# Patient Record
Sex: MALE | Race: BLACK OR AFRICAN AMERICAN | Employment: FULL TIME | URBAN - METROPOLITAN AREA
[De-identification: names, ages, dates, MRNs, and addresses within clinical notes are randomized per-mention and may not be internally consistent; named-entity substitution may affect disease eponyms.]

---

## 2019-01-26 ENCOUNTER — HOSPITAL ENCOUNTER (EMERGENCY)
Age: 54
Discharge: HOME | End: 2019-01-27
Attending: EMERGENCY MEDICINE | Admitting: EMERGENCY MEDICINE
Payer: SELF-PAY

## 2019-01-26 VITALS
RESPIRATION RATE: 18 BRPM | TEMPERATURE: 98 F | SYSTOLIC BLOOD PRESSURE: 165 MMHG | DIASTOLIC BLOOD PRESSURE: 98 MMHG | OXYGEN SATURATION: 98 % | HEART RATE: 88 BPM

## 2019-01-26 DIAGNOSIS — S01.112A LACERATION OF LEFT EYEBROW, INITIAL ENCOUNTER: Primary | ICD-10-CM

## 2019-01-26 PROCEDURE — 75810000293 HC SIMP/SUPERF WND  RPR

## 2019-01-26 PROCEDURE — 77030031132 HC SUT NYL COVD -A

## 2019-01-26 PROCEDURE — 99282 EMERGENCY DEPT VISIT SF MDM: CPT

## 2019-01-27 PROCEDURE — 75810000293 HC SIMP/SUPERF WND  RPR

## 2019-01-27 PROCEDURE — 74011250636 HC RX REV CODE- 250/636: Performed by: PHYSICIAN ASSISTANT

## 2019-01-27 PROCEDURE — 90471 IMMUNIZATION ADMIN: CPT

## 2019-01-27 PROCEDURE — 90715 TDAP VACCINE 7 YRS/> IM: CPT | Performed by: PHYSICIAN ASSISTANT

## 2019-01-27 RX ADMIN — TETANUS TOXOID, REDUCED DIPHTHERIA TOXOID AND ACELLULAR PERTUSSIS VACCINE, ADSORBED 0.5 ML: 5; 2.5; 8; 8; 2.5 SUSPENSION INTRAMUSCULAR at 00:46

## 2019-01-27 NOTE — ED PROVIDER NOTES
EMERGENCY DEPARTMENT HISTORY AND PHYSICAL EXAM 
 
Date: 1/26/2019 Patient Name: Jeanine Giles History of Presenting Illness Chief Complaint Patient presents with  
 Head Injury  Laceration History Provided By: Patient Chief Complaint: head laceration Duration: 8 Hours Timing:  Acute Location: left eyebrow Quality: Aching Severity: Mild Modifying Factors: none Associated Symptoms: denies any other associated signs or symptoms HPI: Jeanine Giles is a 52 y.o. male with a PMH of No significant past medical history who presents to the ER c/o left eyebrow laceration after having a mechanical fall earlier this afternoon. Patient states his hand rail gave way, and he fell hitting his head on the opposite hand rail. He is unsure of his last tetanus shot. He is not on any blood thinners. He did not have any LOC from the fall. He tried cleaning the wound with neosporin. No other symptoms or complaints. PCP: None Past History Past Medical History: 
History reviewed. No pertinent past medical history. Past Surgical History: 
History reviewed. No pertinent surgical history. Family History: 
History reviewed. No pertinent family history. Social History: 
Social History Tobacco Use  Smoking status: Never Smoker  Smokeless tobacco: Never Used Substance Use Topics  Alcohol use: No  
  Frequency: Never  Drug use: No  
 
 
Allergies: Allergies Allergen Reactions  Dilantin [Phenytoin Sodium Extended] Unknown (comments) Review of Systems Review of Systems Constitutional: Negative for chills, fatigue and fever. HENT: Negative. Negative for sore throat. Eyes: Negative. Respiratory: Negative for cough and shortness of breath. Cardiovascular: Negative for chest pain and palpitations. Gastrointestinal: Negative for abdominal pain, nausea and vomiting. Genitourinary: Negative for dysuria. Musculoskeletal: Negative. Skin: Positive for wound. Left eyebrow laceration Neurological: Negative for dizziness, weakness, light-headedness and headaches. Psychiatric/Behavioral: Negative. All other systems reviewed and are negative. Physical Exam  
 
Vitals:  
 01/26/19 2127 BP: (!) 165/98 Pulse: 88 Resp: 18 Temp: 98 °F (36.7 °C) SpO2: 98% Physical Exam  
Constitutional: He is oriented to person, place, and time. He appears well-developed and well-nourished. No distress. HENT:  
Head: Normocephalic. Head is with laceration. Head is without raccoon's eyes and without Kaur's sign. Right Ear: External ear and ear canal normal.  
Left Ear: External ear and ear canal normal.  
Nose: Nose normal.  
Mouth/Throat: Oropharynx is clear and moist.  
Eyes: Conjunctivae are normal. Pupils are equal, round, and reactive to light. No scleral icterus. Neck: Normal range of motion. Neck supple. No JVD present. No spinous process tenderness present. No tracheal deviation present. Cardiovascular: Normal rate, regular rhythm and normal heart sounds. No murmur heard. Pulmonary/Chest: Effort normal and breath sounds normal. No respiratory distress. He has no wheezes. He has no rales. He exhibits no tenderness. Abdominal: Soft. There is no tenderness. Musculoskeletal: Normal range of motion. Neurological: He is alert and oriented to person, place, and time. He has normal strength. Gait normal. GCS eye subscore is 4. GCS verbal subscore is 5. GCS motor subscore is 6. Skin: Skin is warm and dry. He is not diaphoretic. Psychiatric: He has a normal mood and affect. Nursing note and vitals reviewed. Diagnostic Study Results Labs - No results found for this or any previous visit (from the past 12 hour(s)). Radiologic Studies - No orders to display CT Results  (Last 48 hours) None CXR Results  (Last 48 hours) None Medical Decision Making I am the first provider for this patient. I reviewed the vital signs, available nursing notes, past medical history, past surgical history, family history and social history. Vital Signs-Reviewed the patient's vital signs. Records Reviewed: Nursing Notes and Old Medical Records 1:26 AM 
Pt is a 53 y/o female who presents to the ER c/o fall, head laceration. Tried cleaning the wound and dressing with neosporin, however reports continued bleeding. Tetanus updated. Pt NVI in tact and answering all questions appropriately. No clinical indication for further testing or imaging at this time. Wound cleansed and closed with sutures. Discussed wound care, given return precautions. All questions answered and patient in agreement with plan of care. Will plan for discharge. Gilmar Christie PA-C Disposition: 
Discharged DISCHARGE NOTE:  
 
  Care plan outlined and precautions discussed. Patient has no new complaints, changes, or physical findings. Results of n/a were reviewed with the patient. All medications were reviewed with the patient; will d/c home with n/a. All of pt's questions and concerns were addressed. Patient was instructed and agrees to follow up with PCP or ER, as well as to return to the ED upon further deterioration. Patient is ready to go home. Follow-up Information Follow up With Specialties Details Why Contact Info [facility] EMERGENCY DEPT Emergency Medicine  If symptoms worsen [address] 
[phone] [facility] EMERGENCY DEPT Emergency Medicine In 1 week For suture removal [address] 
[phone] There are no discharge medications for this patient. Provider Notes (Medical Decision Making):  
 
Procedures: 
Wound Repair 
Date/Time: 1/27/2019 12:25 AM 
Performed by: PAPreparation: skin prepped with Shur-Clens Pre-procedure re-eval: Immediately prior to the procedure, the patient was reevaluated and found suitable for the planned procedure and any planned medications. Time out: Immediately prior to the procedure a time out was called to verify the correct patient, procedure, equipment, staff and marking as appropriate. Cleo Mil[place] Location details: right eyebrow Wound length:2.6 - 7.5 cm (3 cm) Anesthesia: local infiltration Anesthesia: 
Local Anesthetic: lidocaine 1% without epinephrine Anesthetic total: 5 mL Foreign bodies: no foreign bodies Debridement: none Skin closure: 5-0 nylon Number of sutures: 5 Technique: simple and interrupted Approximation: close Dressing: bandaids. Patient tolerance: Patient tolerated the procedure well with no immediate complications My total time at bedside, performing this procedure was 16-30 minutes (20). Diagnosis Clinical Impression: 1. Laceration of left eyebrow, initial encounter

## 2019-01-27 NOTE — ED TRIAGE NOTES
Patient comes in stating that he was walking down the stairs holding on to the rail when it gave out and he hit his head on the other rail, Has a lacerations above left eye, denies passing out and is not on any blood thinners.

## 2019-01-27 NOTE — DISCHARGE INSTRUCTIONS

## 2019-01-27 NOTE — ED NOTES
12:54 AM 
01/27/19 Discharge instructions given to patient (name) with verbalization of understanding. Patient accompanied by self. Patient discharged with the following prescriptions none. Patient discharged to home (destination).   
  
Fabienne Pineda RN

## 2019-02-05 ENCOUNTER — HOSPITAL ENCOUNTER (EMERGENCY)
Age: 54
Discharge: HOME | End: 2019-02-05
Attending: EMERGENCY MEDICINE
Payer: SELF-PAY

## 2019-02-05 VITALS
TEMPERATURE: 97.7 F | OXYGEN SATURATION: 97 % | RESPIRATION RATE: 15 BRPM | SYSTOLIC BLOOD PRESSURE: 132 MMHG | WEIGHT: 180 LBS | HEART RATE: 61 BPM | DIASTOLIC BLOOD PRESSURE: 93 MMHG

## 2019-02-05 DIAGNOSIS — Z48.02 VISIT FOR SUTURE REMOVAL: Primary | ICD-10-CM

## 2019-02-05 PROCEDURE — 75810000275 HC EMERGENCY DEPT VISIT NO LEVEL OF CARE

## 2019-02-05 NOTE — DISCHARGE INSTRUCTIONS
Patient Education   Return if worsening pain, redness, warmth, discharge. You may wash the laceration site, but do no scrub hard for a few weeks. Learning About Stitches and Staples Removal  When are stitches and staples removed? Your doctor will tell you when to have your stitches or staples removed, usually in 7 to 14 days. How long you'll be told to wait will depend on things like where the wound is located, how big and how deep the wound is, and what your general health is like. Do not remove the stitches on your own. Stitches on the face are usually removed within a week. But stitches and staples on other areas of the body, such as on the back or belly or over a joint, may need to stay in place longer, often a week or two. Be sure to follow your doctor's instructions. How are stitches and staples removed? It usually doesn't hurt when the doctor removes the stitches or staples. You may feel a tug as each stitch or staple is removed. · You will either be seated or lying down. · To remove stitches, the doctor will use scissors to cut each of the knots and then pull the threads out. · To remove staples, the doctor will use a tool to take out the staples one at a time. · The area may still feel tender after the stitches or staples are gone. But it should feel better within a few minutes or up to a few hours. What can you expect after stitches and staples are removed? Depending on the type and location of the cut, you will have a scar. Scars usually fade over time. Keep the area clean, but you won't need a bandage. When should you call for help? Call your doctor now or seek immediate medical care if :  · You have new pain, or your pain gets worse. · You have trouble moving the area near the scar. · You have symptoms of infection, such as:  ? Increased pain, swelling, warmth, or redness around the scar. ? Red streaks leading from the scar. ? Pus draining from the scar. ? A fever.   Watch closely for changes in your health, and be sure to contact your doctor if:  · The scar opens. · You do not get better as expected. Follow-up care is a key part of your treatment and safety. Be sure to make and go to all appointments, and call your doctor if you do not get better as expected. It's also a good idea to keep a list of the medicines you take. Where can you learn more? Go to http://tea-lawrence.info/. Enter H839 in the search box to learn more about \"Learning About Stitches and Staples Removal.\"  Current as of: September 23, 2018  Content Version: 11.9  © 2293-0027 [employer]. Care instructions adapted under license by [employer] (which disclaims liability or warranty for this information). If you have questions about a medical condition or this instruction, always ask your healthcare professional. [address] any warranty or liability for your use of this information.

## 2019-02-05 NOTE — ED PROVIDER NOTES
EMERGENCY DEPARTMENT HISTORY AND PHYSICAL EXAM 
 
1:49 PM 
 
 
Date: 2/5/2019 Patient Name: Brandt Chakraborty History of Presenting Illness Chief Complaint Patient presents with  Suture Removal  
 
 
 
History Provided By: Patient Chief Complaint: suture removal 
Duration: 10 Days Timing:  Improving Location: L eye brow Quality: not reported Severity: N/A Modifying Factors: none Associated Symptoms: denies any other associated signs or symptoms Additional History (Context): Brandt Chakraborty is a 52 y.o. male with No significant past medical history who presents for suture removal. The pt had 5 sutures placed 1/27/2019 to her left eye brow. The wound has been healing and he has had no complications. Denies fever, chills, redness, drainage, swelling to the site, or any other associated sx. No other complaints or concerns. PCP: None Past History Past Medical History: 
History reviewed. No pertinent past medical history. Past Surgical History: 
History reviewed. No pertinent surgical history. Family History: 
History reviewed. No pertinent family history. Social History: 
Social History Tobacco Use  Smoking status: Never Smoker  Smokeless tobacco: Never Used Substance Use Topics  Alcohol use: No  
  Frequency: Never  Drug use: No  
 
 
Allergies: Allergies Allergen Reactions  Dilantin [Phenytoin Sodium Extended] Unknown (comments) Review of Systems Review of Systems Constitutional: Negative for chills and fever. Respiratory: Negative for shortness of breath. Cardiovascular: Negative for chest pain. Gastrointestinal: Negative for nausea and vomiting. Skin: Positive for wound (improved). Negative for color change. Neurological: Negative for weakness and numbness. All other systems reviewed and are negative. Physical Exam  
 
Visit Vitals BP (!) 132/93 (BP 1 Location: Right arm, BP Patient Position: At rest) Pulse 61 Temp 97.7 °F (36.5 °C) Resp 15 Wt 81.6 kg (180 lb) SpO2 97% Physical Exam  
Constitutional: He is oriented to person, place, and time. He appears well-developed and well-nourished. HENT:  
Head: Normocephalic. Head is with laceration. Right Ear: External ear normal.  
Left Ear: External ear normal.  
Nose: Nose normal.  
Mouth/Throat: Oropharynx is clear and moist and mucous membranes are normal.  
Eyes: Conjunctivae and EOM are normal. Pupils are equal, round, and reactive to light. Neck: Normal range of motion. Cardiovascular: Normal rate, regular rhythm and normal heart sounds. Pulmonary/Chest: Effort normal and breath sounds normal.  
Musculoskeletal: Normal range of motion. Neurological: He is alert and oriented to person, place, and time. Skin: Skin is warm and dry. Psychiatric: He has a normal mood and affect. His behavior is normal. Judgment and thought content normal.  
Nursing note and vitals reviewed. Diagnostic Study Results Labs - No results found for this or any previous visit (from the past 12 hour(s)). Radiologic Studies - No orders to display Medical Decision Making I am the first provider for this patient. I reviewed the vital signs, available nursing notes, past medical history, past surgical history, family history and social history. Vital Signs-Reviewed the patient's vital signs. Records Reviewed: Nursing Notes and Old Medical Records (Time of Review: 1:49 PM) Provider Notes (Medical Decision Making):  
 
Procedures: 
Suture/Staple Removal 
Date/Time: 2/5/2019 1:52 PM 
Performed by: Elvis Parra PA-C Authorized by: Aylin Townsend MD  
 
Consent:  
  Consent obtained:  Verbal 
  Consent given by:  Patient Risks discussed:  Bleeding, pain and wound separation Alternatives discussed:  No treatment and delayed treatment Universal protocol: Procedure explained and questions answered to patient or proxy's satisfaction: yes Relevant documents present and verified: yes Required blood products, implants, devices, and special equipment available: no   
  Site/side marked: yes Immediately prior to procedure, a time out was called: yes Location: Location:  Head/neck Head/neck location:  Eyebrow Eyebrow location:  L eyebrow Procedure details:  
  Wound appearance:  No signs of infection, good wound healing and clean Number of sutures removed:  5 Post-procedure details: Post-removal:  No dressing applied Patient tolerance of procedure: Tolerated well, no immediate complications Based upon the patient's presentation with noted HPI and PE,, I believe that the patient is having a well healing laceration with prior suture repair, and now with removal. 
 
Plan/discharge instructions Return if worsening pain, redness, warmth, discharge. You may wash the laceration site, but do no scrub hard for a few weeks. Pt results have been reviewed with the patient and any family present. They have been counseled regarding diagnosis, treatment, and plan. They verbally convey understanding and agreement of the signs, symptoms, diagnosis, treatment and prognosis and additionally agrees to follow up as discussed. They also agree with the care-plan and convey that all of their questions have been answered. I have also provided discharge instructions for them that include: educational information regarding their diagnosis and treatment, and list of reasons why they would want to return to the ED prior to their follow-up appointment, should their condition change. Thai Casey PA-C Diagnosis Clinical Impression: 1. Visit for suture removal   
 
Disposition: home Follow-up Information Follow up With Specialties Details Why Contact [facility] EMERGENCY DEPT Emergency Medicine  As needed, If symptoms worsen [address] [facility] [address] 
[phone] [address] in 2 days  [place] [address] 
[address] ([facility]) [address] [place] [address] Medication List  
  
You have not been prescribed any medications. _______________________________ Attestations: 
Scribe Attestation Bessy Mcadams acting as a scribe for and in the presence [employer], [place] February 05, 2019 at 1:49 PM 
    
Provider Attestation:     
I personally performed the services described in the documentation, reviewed the documentation, as recorded by the scribe in my presence, and it accurately and completely records my words and actions. February 05, 2019 at 1:49 PM - Ghanshyam Herbert PA-C 
_______________________________

## 2021-09-16 ENCOUNTER — HOSPITAL ENCOUNTER (OUTPATIENT)
Dept: LAB | Age: 56
Discharge: HOME | End: 2021-09-16

## 2021-09-16 LAB — SENTARA SPECIMEN COL,SENBCF: NORMAL

## 2021-09-16 PROCEDURE — 99001 SPECIMEN HANDLING PT-LAB: CPT

## 2022-03-11 ENCOUNTER — HOSPITAL ENCOUNTER (OUTPATIENT)
Dept: LAB | Age: 57
Discharge: HOME | End: 2022-03-11

## 2022-03-11 ENCOUNTER — APPOINTMENT (OUTPATIENT)
Dept: LAB | Age: 57
End: 2022-03-11

## 2022-03-11 LAB — SENTARA SPECIMEN COL,SENBCF: NORMAL

## 2022-03-11 PROCEDURE — 99001 SPECIMEN HANDLING PT-LAB: CPT

## 2023-03-15 ENCOUNTER — HOSPITAL ENCOUNTER (OUTPATIENT)
Facility: HOSPITAL | Age: 58
Discharge: HOME | End: 2023-03-18

## 2023-03-15 LAB — SENTARA SPECIMEN COLLECTION: NORMAL

## 2023-03-15 PROCEDURE — 99001 SPECIMEN HANDLING PT-LAB: CPT

## 2024-04-23 ENCOUNTER — APPOINTMENT (OUTPATIENT)
Facility: HOSPITAL | Age: 59
End: 2024-04-23
Payer: MEDICAID

## 2024-04-23 ENCOUNTER — HOSPITAL ENCOUNTER (EMERGENCY)
Facility: HOSPITAL | Age: 59
Discharge: HOME | End: 2024-04-23
Payer: MEDICAID

## 2024-04-23 VITALS
HEIGHT: 66 IN | WEIGHT: 200 LBS | RESPIRATION RATE: 20 BRPM | SYSTOLIC BLOOD PRESSURE: 127 MMHG | DIASTOLIC BLOOD PRESSURE: 93 MMHG | HEART RATE: 64 BPM | BODY MASS INDEX: 32.14 KG/M2 | TEMPERATURE: 98.1 F | OXYGEN SATURATION: 98 %

## 2024-04-23 DIAGNOSIS — M25.511 ACUTE PAIN OF RIGHT SHOULDER: Primary | ICD-10-CM

## 2024-04-23 PROCEDURE — 73030 X-RAY EXAM OF SHOULDER: CPT

## 2024-04-23 PROCEDURE — 99284 EMERGENCY DEPT VISIT MOD MDM: CPT

## 2024-04-23 PROCEDURE — 96372 THER/PROPH/DIAG INJ SC/IM: CPT

## 2024-04-23 PROCEDURE — 6360000002 HC RX W HCPCS

## 2024-04-23 RX ORDER — LIDOCAINE 50 MG/G
1 PATCH TOPICAL DAILY
Qty: 10 PATCH | Refills: 0 | Status: SHIPPED | OUTPATIENT
Start: 2024-04-23 | End: 2024-05-03

## 2024-04-23 RX ORDER — ACETAMINOPHEN 500 MG
500 TABLET ORAL 4 TIMES DAILY PRN
Qty: 30 TABLET | Refills: 0 | Status: SHIPPED | OUTPATIENT
Start: 2024-04-23

## 2024-04-23 RX ORDER — KETOROLAC TROMETHAMINE 15 MG/ML
15 INJECTION, SOLUTION INTRAMUSCULAR; INTRAVENOUS
Status: COMPLETED | OUTPATIENT
Start: 2024-04-23 | End: 2024-04-23

## 2024-04-23 RX ADMIN — KETOROLAC TROMETHAMINE 15 MG: 15 INJECTION, SOLUTION INTRAMUSCULAR; INTRAVENOUS at 11:44

## 2024-04-23 ASSESSMENT — PAIN DESCRIPTION - LOCATION: LOCATION: SHOULDER

## 2024-04-23 ASSESSMENT — PAIN - FUNCTIONAL ASSESSMENT: PAIN_FUNCTIONAL_ASSESSMENT: 0-10

## 2024-04-23 ASSESSMENT — PAIN DESCRIPTION - ORIENTATION: ORIENTATION: RIGHT

## 2024-04-23 ASSESSMENT — PAIN SCALES - GENERAL
PAINLEVEL_OUTOF10: 10
PAINLEVEL_OUTOF10: 6

## 2024-04-23 NOTE — ED PROVIDER NOTES
Procedures     Procedures    ED Course     11:13 AM EDT I (Maria L Lorenzo PA-C) am the first provider for this patient. Initial assessment performed. I reviewed the vital signs, available nursing notes, past medical history, past surgical history, family history and social history. The patients presenting problems have been discussed, and they are in agreement with the care plan formulated and outlined with them.  I have encouraged them to ask questions as they arise throughout their visit.    Records Reviewed: Nursing Notes and Old Medical Records    Is this patient to be included in the SEP-1 core measure? No Exclusion criteria - the patient is NOT to be included for SEP-1 Core Measure due to: 2+ SIRS criteria are not met    MEDICATIONS ADMINISTERED IN THE ED:  Medications   ketorolac (TORADOL) injection 15 mg (has no administration in time range)            Medical Decision Making   Differential diagnosis: Shoulder strain, rotator cuff tear, dislocation, fracture      58-year-old male presented ambulatory to the ED with nontraumatic right shoulder pain.  On physical exam he was well-appearing afebrile, nontachycardic in no acute distress.  On examination of shoulder patient had difficulty with overhead movement, but otherwise normal exam with no obvious joint laxity, bony deformity, and was neurovascularly intact.  X-ray showed no acute findings.  Patient was encouraged symptomatic management and close follow-up with orthopedics.  Patient to return to the ED immediately worsening or development of symptoms  Disposition Considerations : D/C    Critical Care Time:     Maria L Lorenzo     Diagnosis and Disposition     DIAGNOSIS:   1. Acute pain of right shoulder        DISPOSITION        PATIENT REFERRED TO:  Brady Christian MD  [address] [facility] [address]  [phone]    Go in 1 day      [facility] EMERGENCY DEPT  [address] [facility] [address]  [phone]    As needed, If symptoms

## 2024-07-16 ENCOUNTER — OFFICE VISIT (OUTPATIENT)
Age: 59
End: 2024-07-16
Payer: MEDICAID

## 2024-07-16 VITALS — WEIGHT: 217 LBS | BODY MASS INDEX: 34.87 KG/M2 | HEIGHT: 66 IN

## 2024-07-16 DIAGNOSIS — M19.011 PRIMARY OSTEOARTHRITIS OF RIGHT SHOULDER: Primary | ICD-10-CM

## 2024-07-16 PROCEDURE — 99204 OFFICE O/P NEW MOD 45 MIN: CPT | Performed by: ORTHOPAEDIC SURGERY

## 2024-07-16 PROCEDURE — 20610 DRAIN/INJ JOINT/BURSA W/O US: CPT | Performed by: ORTHOPAEDIC SURGERY

## 2024-07-16 RX ORDER — TRIAMCINOLONE ACETONIDE 40 MG/ML
40 INJECTION, SUSPENSION INTRA-ARTICULAR; INTRAMUSCULAR ONCE
Status: COMPLETED | OUTPATIENT
Start: 2024-07-16 | End: 2024-07-16

## 2024-07-16 RX ORDER — LIDOCAINE HYDROCHLORIDE 10 MG/ML
3 INJECTION, SOLUTION INFILTRATION; PERINEURAL ONCE
Status: COMPLETED | OUTPATIENT
Start: 2024-07-16 | End: 2024-07-16

## 2024-07-16 RX ADMIN — TRIAMCINOLONE ACETONIDE 40 MG: 40 INJECTION, SUSPENSION INTRA-ARTICULAR; INTRAMUSCULAR at 14:05

## 2024-07-16 RX ADMIN — LIDOCAINE HYDROCHLORIDE 3 ML: 10 INJECTION, SOLUTION INFILTRATION; PERINEURAL at 14:04

## 2024-07-16 NOTE — PROGRESS NOTES
[facility] AMBULATORY OFFICE NOTE      Patient: Joyn Anderson Jr.                MRN: [id number]       SSN: [id number]  YOB: 1965        AGE: 58 y.o.        SEX: male  Body mass index is 35.02 kg/m².    PCP: Brady Christian MD  07/16/24    CHIEF COMPLAINT: Right shoulder pain    HPI: Jony Anderson Jr. is a 58 y.o. male patient who complains of intermittent right shoulder pain for the past several months.  He was seen in the emergency room in April due to the pain.  This was after several days working in the yard.  He has been having intermittent pain.  He takes a muscle relaxer and Tylenol which she said does not significantly help.  No injury or trauma.  Pain today is 6-8 out of 10.    History reviewed. No pertinent past medical history.    History reviewed. No pertinent family history.    Current Outpatient Medications   Medication Sig Dispense Refill    acetaminophen (TYLENOL) 500 MG tablet Take 1 tablet by mouth 4 times daily as needed for Pain 30 tablet 0     No current facility-administered medications for this visit.       Allergies   Allergen Reactions    Phenytoin Sodium Extended      Other reaction(s): Unknown (comments)       History reviewed. No pertinent surgical history.    Social History     Socioeconomic History    Marital status: Single     Spouse name: Not on file    Number of children: Not on file    Years of education: Not on file    Highest education level: Not on file   Occupational History    Not on file   Tobacco Use    Smoking status: Never    Smokeless tobacco: Never   Substance and Sexual Activity    Alcohol use: No    Drug use: No    Sexual activity: Not on file   Other Topics Concern    Not on file   Social History Narrative    Not on file     Social Determinants of Health     Financial Resource Strain: Not on file   Food Insecurity: Not on file   Transportation Needs: Not on file   Physical Activity: Not on file   Stress: Not on file

## 2024-08-27 ENCOUNTER — OFFICE VISIT (OUTPATIENT)
Age: 59
End: 2024-08-27
Payer: MEDICAID

## 2024-08-27 VITALS — HEIGHT: 66 IN | RESPIRATION RATE: 16 BRPM | BODY MASS INDEX: 35.02 KG/M2

## 2024-08-27 DIAGNOSIS — M19.011 PRIMARY OSTEOARTHRITIS OF RIGHT SHOULDER: Primary | ICD-10-CM

## 2024-08-27 PROCEDURE — 99214 OFFICE O/P EST MOD 30 MIN: CPT | Performed by: ORTHOPAEDIC SURGERY

## 2024-08-27 RX ORDER — METHYLPREDNISOLONE 4 MG
TABLET, DOSE PACK ORAL
Qty: 1 KIT | Refills: 0 | Status: SHIPPED | OUTPATIENT
Start: 2024-08-27 | End: 2024-09-02

## 2024-08-27 NOTE — PROGRESS NOTES
[facility] AMBULATORY OFFICE NOTE    Patient: Jony Anderson Jr.                MRN: [id number]       SSN: [id number]  YOB: 1965        AGE: 58 y.o.        SEX: male  Body mass index is 35.02 kg/m².    PCP: Brady Christian MD  08/27/24    Chief Complaint: Right shoulder pain    HPI: Jony Anderson Jr. is a 58 y.o. male patient who returns today for his right shoulder.  He said the injection that he was given at his last visit gave him significant pain relief but over the past few days the pain is returned.  He does not recall any specific injury or trauma to the shoulder caused the pain to return.    No past medical history on file.    No family history on file.    Current Outpatient Medications   Medication Sig Dispense Refill    methylPREDNISolone (MEDROL DOSEPACK) 4 MG tablet Take by mouth. 1 kit 0    acetaminophen (TYLENOL) 500 MG tablet Take 1 tablet by mouth 4 times daily as needed for Pain 30 tablet 0     No current facility-administered medications for this visit.       Allergies   Allergen Reactions    Phenytoin Sodium Extended      Other reaction(s): Unknown (comments)       No past surgical history on file.    Social History     Socioeconomic History    Marital status: Single     Spouse name: Not on file    Number of children: Not on file    Years of education: Not on file    Highest education level: Not on file   Occupational History    Not on file   Tobacco Use    Smoking status: Never    Smokeless tobacco: Never   Substance and Sexual Activity    Alcohol use: No    Drug use: No    Sexual activity: Not on file   Other Topics Concern    Not on file   Social History Narrative    Not on file     Social Determinants of Health     Financial Resource Strain: Not on file   Food Insecurity: Not on file   Transportation Needs: Not on file   Physical Activity: Not on file   Stress: Not on file   Social Connections: Not on file   Intimate Partner Violence: Not on

## 2024-09-26 ENCOUNTER — HOSPITAL ENCOUNTER (OUTPATIENT)
Facility: HOSPITAL | Age: 59
Discharge: HOME | End: 2024-09-29
Attending: ORTHOPAEDIC SURGERY
Payer: MEDICAID

## 2024-09-26 DIAGNOSIS — M19.011 PRIMARY OSTEOARTHRITIS OF RIGHT SHOULDER: ICD-10-CM

## 2024-09-26 PROCEDURE — 73221 MRI JOINT UPR EXTREM W/O DYE: CPT

## 2024-12-13 ENCOUNTER — HOSPITAL ENCOUNTER (OUTPATIENT)
Facility: HOSPITAL | Age: 59
Setting detail: SPECIMEN
Discharge: HOME | End: 2024-12-16

## 2024-12-13 LAB — SENTARA SPECIMEN COLLECTION: NORMAL

## 2024-12-13 PROCEDURE — 99001 SPECIMEN HANDLING PT-LAB: CPT

## 2025-08-14 ENCOUNTER — OFFICE VISIT (OUTPATIENT)
Age: 60
End: 2025-08-14
Payer: MEDICAID

## 2025-08-14 DIAGNOSIS — G89.29 CHRONIC RIGHT SHOULDER PAIN: ICD-10-CM

## 2025-08-14 DIAGNOSIS — M19.011 PRIMARY OSTEOARTHRITIS OF RIGHT SHOULDER: Primary | ICD-10-CM

## 2025-08-14 DIAGNOSIS — M75.51 SUBACROMIAL BURSITIS OF RIGHT SHOULDER JOINT: ICD-10-CM

## 2025-08-14 DIAGNOSIS — M67.813 TENDINOSIS OF RIGHT ROTATOR CUFF: ICD-10-CM

## 2025-08-14 DIAGNOSIS — M25.511 CHRONIC RIGHT SHOULDER PAIN: ICD-10-CM

## 2025-08-14 PROCEDURE — 99213 OFFICE O/P EST LOW 20 MIN: CPT | Performed by: NURSE PRACTITIONER
